# Patient Record
Sex: FEMALE | Race: WHITE | NOT HISPANIC OR LATINO | Employment: FULL TIME | URBAN - METROPOLITAN AREA
[De-identification: names, ages, dates, MRNs, and addresses within clinical notes are randomized per-mention and may not be internally consistent; named-entity substitution may affect disease eponyms.]

---

## 2018-05-04 ENCOUNTER — TELEPHONE (OUTPATIENT)
Dept: PEDIATRICS | Facility: CLINIC | Age: 53
End: 2018-05-04

## 2019-12-11 ENCOUNTER — NURSE TRIAGE (OUTPATIENT)
Dept: NURSING | Facility: CLINIC | Age: 54
End: 2019-12-11

## 2019-12-11 NOTE — TELEPHONE ENCOUNTER
"Patient says she fell on the ice at about 2:30pm when got home work.  Patient reports she hit head on ice by her car.  Patient has a headache; pain level is 4-5/10.  Reviewed care advice with caller per RN triage protocol.  FNA advised to call back with any concerns.   Caller verbalized understanding.      Additional Information    Negative: [1] ACUTE NEURO SYMPTOM AND [2] present now  (DEFINITION: difficult to awaken OR confused thinking and talking OR slurred speech OR weakness of arms OR unsteady walking)    Negative: Knocked out (unconscious) > 1 minute    Negative: Seizure (convulsion) occurred  (Exception: prior history of seizures and now alert and without Acute Neuro Symptoms)    Negative: Penetrating head injury (e.g., knife, gun shot wound, metal object)    Negative: [1] Major bleeding (e.g., actively dripping or spurting) AND [2] can't be stopped    Negative: [1] Dangerous mechanism of injury (e.g., MVA, diving, trampoline, contact sports, fall > 10 feet or 3 meters) AND [2] NECK pain AND [3] began < 1 hour after injury    Negative: Sounds like a life-threatening emergency to the triager    Negative: [1] Recently examined and diagnosed with a concussion by a healthcare provider AND [2] questions about concussion symptoms    Negative: Can't remember what happened (amnesia)    Negative: Vomiting once or more    Negative: [1] Loss of vision or double vision AND [2] present now    Negative: Watery or blood-tinged fluid dripping from the NOSE or EARS now  (Exception: tears from crying or nosebleed from nasal trauma)    Negative: One or two \"black eyes\" (bruising, purple color of eyelids)    Negative: Large swelling or bruise > 2 inches (5 cm)    Negative: Skin is split open or gaping  (or length > 1/2 inch or 12 mm)    Negative: [1] Bleeding AND [2] won't stop after 10 minutes of direct pressure (using correct technique)    Negative: Sounds like a serious injury to the triager    Negative: [1] ACUTE NEURO " SYMPTOM AND [2] now fine  (DEFINITION: difficult to awaken OR confused thinking and talking OR slurred speech OR weakness of arms OR unsteady walking)    Negative: [1] Knocked out (unconscious) < 1 minute AND [2] now fine    Negative: [1] SEVERE headache AND [2] not improved 2 hours after pain medicine/ice packs    Negative: Dangerous injury (e.g., MVA, diving, trampoline, contact sports, fall > 10 feet or 3 meters) or severe blow from hard object (e.g., golf club or baseball bat)    Negative: Taking Coumadin (warfarin) or other strong blood thinner, or known bleeding disorder (e.g., thrombocytopenia)    Negative: Suspicious history for the injury    Negative: [1] Age over 65 years AND [2] swelling or bruise    Negative: Patient is confused or is an unreliable provider of information (e.g., dementia, profound mental retardation, alcohol intoxication)    Negative: [1] Last tetanus shot > 5 years ago AND [2] DIRTY cut or scrape    Negative: [1] After 72 hours AND [2] headache persists    Scalp swelling, bruise or pain    Protocols used: HEAD INJURY-A-

## 2021-01-04 ENCOUNTER — NURSE TRIAGE (OUTPATIENT)
Dept: NURSING | Facility: CLINIC | Age: 56
End: 2021-01-04

## 2021-01-04 NOTE — TELEPHONE ENCOUNTER
Triage Call: Pt gave spouse, Danilo consent to communicate.   Pt was seen in an the ER last night and was prescribed Prednisone 20mg bid for a rotator cuff issues. Spouse states that they have been trying to reach pts pcp all day because they stated she has a hx of pancreatitis and seen that the medication lists it as a side effect. Pts PCP has not returned there call. Pts PCP is an [facility] provider. Pt refused to take the medication today. Pt was advised to call her pharmacy, and to be seen in an urgent care if there pcp does not address the pts concerns.     COVID 19 Nurse Triage Plan/Patient Instructions    Please be aware that novel coronavirus (COVID-19) may be circulating in the community. If you develop symptoms such as fever, cough, or SOB or if you have concerns about the presence of another infection including coronavirus (COVID-19), please contact your health care provider or visit [email or web address].     Disposition/Instructions    In-Person Visit with provider recommended. Reference Visit Selection Guide.    Thank you for taking steps to prevent the spread of this virus.  o Limit your contact with others.  o Wear a simple mask to cover your cough.  o Wash your hands well and often.    Resources    [facility]: About COVID-19: [email or web address]    CDC: What to Do If You're Sick: www.cdc.gov/coronavirus/2019-ncov/about/steps-when-sick.html    CDC: Ending Home Isolation: www.cdc.gov/coronavirus/2019-ncov/hcp/disposition-in-home-patients.html     CDC: Caring for Someone: www.cdc.gov/coronavirus/2019-ncov/if-you-are-sick/care-for-someone.html     [facility]: Interim Guidance for Hospital Discharge to Home: www.health.[facility].[place].us/diseases/coronavirus/hcp/hospdischarge.pdf    [facility] clinical trials (COVID-19 research studies): clinicalaffairs.[facility]/umn-clinical-trials     Below are the COVID-19 hotlines at the [place] Department of Health ([facility]). Interpreters are available.   o For  "health questions: Call 498-696-3807 or 1-560.379.4715 (7 a.m. to 7 p.m.)  o For questions about schools and childcare: Call 676-730-0996 or 1-347.884.8700 (7 a.m. to 7 p.m.)       Jen Guevara RN Nursing Advisor 1/4/2021 4:58 PM     Reason for Disposition    Caller has urgent medication question about med that PCP prescribed and triager unable to answer question    Additional Information    Negative: Drug overdose and nurse unable to answer question    Negative: Caller requesting information not related to medicine    Negative: Caller requesting a prescription for Strep throat and has a positive culture result    Negative: Rash while taking a medication or within 3 days of stopping it    Negative: Immunization reaction suspected    Negative: [1] Asthma AND [2] having symptoms of asthma (cough, wheezing, etc)    Negative: MORE THAN A DOUBLE DOSE of a prescription or over-the-counter (OTC) drug    Negative: [1] DOUBLE DOSE (an extra dose or lesser amount) of over-the-counter (OTC) drug AND [2] any symptoms (e.g., dizziness, nausea, pain, sleepiness)    Negative: [1] DOUBLE DOSE (an extra dose or lesser amount) of prescription drug AND [2] any symptoms (e.g., dizziness, nausea, pain, sleepiness)    Negative: Took another person's prescription drug    Negative: Pharmacy calling with prescription questions and triager unable to answer question    Negative: [1] Prescription not at pharmacy AND [2] was prescribed today by PCP    Negative: [1] Request for URGENT new prescription or refill of \"essential\" medication (i.e., likelihood of harm to patient if not taken) AND [2] triager unable to fill per unit policy    Negative: Diabetes drug error or overdose (e.g., insulin or extra dose)    Negative: [1] DOUBLE DOSE (an extra dose or lesser amount) of prescription drug AND [2] NO symptoms (Exception: a double dose of antibiotics)    Protocols used: MEDICATION QUESTION CALL-A-      "

## 2022-06-20 NOTE — PROGRESS NOTES
CHIEF COMPLAINT:  No chief complaint on file.       HISTORY OF PRESENT ILLNESS  Ms. Grijalva is a pleasant 56 year old year old female who presents to clinic today with Left knee pain.  Patricia explains that    Onset: gradual  Location: left knee  Quality:  Aching, popping  Duration: 6-8 months   Severity: 9/10 at worst  Timing:constant  Modifying factors:  resting and non-use makes it better, movement and use makes it worse  Associated signs & symptoms: none  Previous similar pain: No  Treatments to date:ice, aleve, tylenol PM, knee sleeve    Additional history: as documented    Review of Systems: A 10-point review of systems was obtained and is negative except for as noted in the HPI.       MEDICAL HISTORY  Patient Active Problem List   Diagnosis     [facility] SPRAIN OF NECK     [facility] OTHER BACK SYMPTOMS       Current Outpatient Medications   Medication Sig Dispense Refill     ibuprofen (ADVIL,MOTRIN) 600 MG tablet Take 1 tablet by mouth every 6 hours as needed for pain. 30 tablet 1       Allergies   Allergen Reactions     Latex      Penicillins      Percocet [Oxycodone-Acetaminophen]        No family history on file.    Additional medical/Social/Surgical histories reviewed in [facility] and updated as appropriate.       PHYSICAL EXAM  There were no vitals taken for this visit.    General  - normal appearance, in no obvious distress  HEENT  - conjunctivae not injected, moist mucous membranes  CV  - normal popliteal pulse  Pulm  - normal respiratory pattern, non-labored  Musculoskeletal -left knee  - stance: Mildly antalgic gait  - inspection: no swelling or effusion, normal bone and joint alignment, no obvious deformity  - palpation: Medial joint line tenderness, patella and patellar tendon non-tender  - ROM: 125 degrees flexion, -5 degrees extension, not painful, normal actively and passively compared to contralateral  - strength: 5/5 in flexion, 5/5 in extension  - special tests:  (-) Lachman with mild laxity bilaterally,  but firm end feel bilaterally  (-) anterior drawer  (-) Ceci  (-) varus at 0 and 30 degrees flexion  (-) valgus at 0 and 30 degrees flexion  Neuro  - no sensory or motor deficit, grossly normal coordination, normal muscle tone  Skin  - no ecchymosis, erythema, warmth, or induration, no obvious rash  Psych  - interactive, appropriate, normal mood and affect    IMAGING :  For Patients: As a result of the 21st Century Cures Act, medical imaging exams and procedure reports are released immediately into your electronic medical record. You may view this report before your referring provider. If you have questions, please contact your health care provider.     EXAM: XR KNEE 2 VIEWS LEFT   LOCATION: [facility]   DATE/TIME: 3/28/2022 6:38 PM     INDICATION: KNEE PAIN/PROBLEM   COMPARISON: None.     IMPRESSION: Normal joint spaces and alignment. No fracture or joint effusion.       ASSESSMENT & PLAN  Ms. Grijalva is a 56 year old year old female who presents to clinic today with chronic medial sided left knee pain.    XR at [facility] unremarkable per report.  ED note reviewed 3/26/22.    Initially diagnosed with MCL sprain on March 26.  Otherwise, not improving greatly.  Suspected meniscus tear, medial of left knee.    Discussed further diagnostics including MRI, treatments such as PT, injections of corticosteroid, and other adjuncts.    Diagnosis:   Chronic pain of left knee    -Wishes to start PT, had a great encounter with PT in the past and worked well for her  -Consider injection/MRI if no improvement in 6 weeks  -Continue with ice, aleve, and knee sleeve with activity  -Follow up in 6 weeks.    It was a pleasure seeing Patricia today.    Darrian Anrde DO, CAM  Primary Care Sports Medicine

## 2022-06-22 ENCOUNTER — OFFICE VISIT (OUTPATIENT)
Dept: ORTHOPEDICS | Facility: CLINIC | Age: 57
End: 2022-06-22
Payer: COMMERCIAL

## 2022-06-22 VITALS — WEIGHT: 183 LBS | DIASTOLIC BLOOD PRESSURE: 94 MMHG | SYSTOLIC BLOOD PRESSURE: 142 MMHG

## 2022-06-22 DIAGNOSIS — M25.562 CHRONIC PAIN OF LEFT KNEE: Primary | ICD-10-CM

## 2022-06-22 DIAGNOSIS — G89.29 CHRONIC PAIN OF LEFT KNEE: Primary | ICD-10-CM

## 2022-06-22 PROCEDURE — 99203 OFFICE O/P NEW LOW 30 MIN: CPT | Performed by: FAMILY MEDICINE

## 2022-06-22 RX ORDER — LOSARTAN POTASSIUM 50 MG/1
50 TABLET ORAL DAILY
COMMUNITY

## 2022-06-22 NOTE — LETTER
6/22/2022         RE: Patricia Grijalva  [address] [address] [id number]        Dear Colleague,    Thank you for referring your patient, Patricia Grjialva, to the [facility] [place]. Please see a copy of my visit note below.    CHIEF COMPLAINT:  No chief complaint on file.       HISTORY OF PRESENT ILLNESS  Ms. Grijalva is a pleasant 56 year old year old female who presents to clinic today with Left knee pain.  Patricia explains that    Onset: gradual  Location: left knee  Quality:  Aching, popping  Duration: 6-8 months   Severity: 9/10 at worst  Timing:constant  Modifying factors:  resting and non-use makes it better, movement and use makes it worse  Associated signs & symptoms: none  Previous similar pain: No  Treatments to date:ice, aleve, tylenol PM, knee sleeve    Additional history: as documented    Review of Systems: A 10-point review of systems was obtained and is negative except for as noted in the HPI.       MEDICAL HISTORY  Patient Active Problem List   Diagnosis     [facility] SPRAIN OF NECK     [facility] OTHER BACK SYMPTOMS       Current Outpatient Medications   Medication Sig Dispense Refill     ibuprofen (ADVIL,MOTRIN) 600 MG tablet Take 1 tablet by mouth every 6 hours as needed for pain. 30 tablet 1       Allergies   Allergen Reactions     Latex      Penicillins      Percocet [Oxycodone-Acetaminophen]        No family history on file.    Additional medical/Social/Surgical histories reviewed in [facility] and updated as appropriate.       PHYSICAL EXAM  There were no vitals taken for this visit.    General  - normal appearance, in no obvious distress  HEENT  - conjunctivae not injected, moist mucous membranes  CV  - normal popliteal pulse  Pulm  - normal respiratory pattern, non-labored  Musculoskeletal -left knee  - stance: Mildly antalgic gait  - inspection: no swelling or effusion, normal bone and joint alignment, no obvious deformity  - palpation: Medial joint line tenderness,  patella and patellar tendon non-tender  - ROM: 125 degrees flexion, -5 degrees extension, not painful, normal actively and passively compared to contralateral  - strength: 5/5 in flexion, 5/5 in extension  - special tests:  (-) Lachman with mild laxity bilaterally, but firm end feel bilaterally  (-) anterior drawer  (-) Ceci  (-) varus at 0 and 30 degrees flexion  (-) valgus at 0 and 30 degrees flexion  Neuro  - no sensory or motor deficit, grossly normal coordination, normal muscle tone  Skin  - no ecchymosis, erythema, warmth, or induration, no obvious rash  Psych  - interactive, appropriate, normal mood and affect    IMAGING :  For Patients: As a result of the 21st Century Cures Act, medical imaging exams and procedure reports are released immediately into your electronic medical record. You may view this report before your referring provider. If you have questions, please contact your health care provider.     EXAM: XR KNEE 2 VIEWS LEFT   LOCATION: [facility] [place]   DATE/TIME: 3/28/2022 6:38 PM     INDICATION: KNEE PAIN/PROBLEM   COMPARISON: None.     IMPRESSION: Normal joint spaces and alignment. No fracture or joint effusion.       ASSESSMENT & PLAN  Ms. Grijalva is a 56 year old year old female who presents to clinic today with chronic medial sided left knee pain.    XR at [facility] unremarkable per report.  ED note reviewed 3/26/22.    Initially diagnosed with MCL sprain on March 26.  Otherwise, not improving greatly.  Suspected meniscus tear, medial of left knee.    Discussed further diagnostics including MRI, treatments such as PT, injections of corticosteroid, and other adjuncts.    Diagnosis:   Chronic pain of left knee    -Wishes to start PT, had a great encounter with PT in the past and worked well for her  -Consider injection/MRI if no improvement in 6 weeks  -Continue with ice, aleve, and knee sleeve with activity  -Follow up in 6 weeks.    It was a pleasure seeing Patricia today.    Darrian  DO Thai, [facility]  Primary Care Sports Medicine      Again, thank you for allowing me to participate in the care of your patient.        Sincerely,        Darrian Andre DO

## 2022-10-24 ENCOUNTER — VIRTUAL VISIT (OUTPATIENT)
Dept: FAMILY MEDICINE | Facility: CLINIC | Age: 57
End: 2022-10-24
Payer: COMMERCIAL

## 2022-10-24 ENCOUNTER — NURSE TRIAGE (OUTPATIENT)
Dept: NURSING | Facility: CLINIC | Age: 57
End: 2022-10-24

## 2022-10-24 VITALS — WEIGHT: 180 LBS

## 2022-10-24 DIAGNOSIS — J20.9 ACUTE BRONCHITIS, UNSPECIFIED ORGANISM: Primary | ICD-10-CM

## 2022-10-24 PROCEDURE — 99213 OFFICE O/P EST LOW 20 MIN: CPT | Mod: CS | Performed by: NURSE PRACTITIONER

## 2022-10-24 RX ORDER — PREDNISONE 20 MG/1
40 TABLET ORAL DAILY
Qty: 14 TABLET | Refills: 0 | Status: SHIPPED | OUTPATIENT
Start: 2022-10-24 | End: 2022-10-31

## 2022-10-24 RX ORDER — BENZONATATE 200 MG/1
200 CAPSULE ORAL 3 TIMES DAILY PRN
Qty: 30 CAPSULE | Refills: 0 | Status: SHIPPED | OUTPATIENT
Start: 2022-10-24

## 2022-10-24 ASSESSMENT — ENCOUNTER SYMPTOMS
COUGH: 1
FATIGUE: 1
SHORTNESS OF BREATH: 1

## 2022-10-24 NOTE — TELEPHONE ENCOUNTER
S-(situation): post COVID symptoms    B-(background):    Danilo hampton, pt gave verbal consent    The entire family tested positive for COVID on 10/15.  Pt and  was given Paxlovid at the [facility] which they have completed.  and 2 kids are better.  Pt has lingering symptoms    Pt also has anxiety    Symptoms started with bad headaches, fever, nausea, weakness, cough. All symptoms have improved except for cough.    A-(assessment):   Deep nagging cough which is worse than when she first had COVID  Constant chest tightness, described as mild     did report that she was better than before, except that her symptoms have not cleared up.    Pt still able to do chores, currently cooking  No SOB  No fever    R-(recommendations):   Virtual visit today.  Transferred to .    Ingrid Gresham RN/[place] Nurse Advisor          Reason for Disposition    [1] PERSISTING SYMPTOMS OF COVID-19 AND [2] symptoms WORSE    Additional Information    Negative: SEVERE difficulty breathing (e.g., struggling for each breath, speaks in single words)    Negative: [1] SEVERE weakness (e.g., can't stand or can barely walk) AND [2] new-onset or WORSE    Negative: Difficult to awaken or acting confused (e.g., disoriented, slurred speech)    Negative: Bluish (or gray) lips or face now    Negative: Sounds like a life-threatening emergency to the triager    Negative: [1] Typical COVID-19 symptoms AND [2] lasting less than 3 weeks    Negative: [1] Chest pain, pressure, or tightness AND [2] new-onset or worsening    Negative: [1] Fever AND [2] new-onset or worsening    Negative: [1] MODERATE difficulty breathing (e.g., speaks in phrases, SOB even at rest, pulse 100-120) AND [2] new-onset or WORSE    Negative: [1] MODERATE difficulty breathing AND [2] oxygen level (e.g., pulse oximetry) 91 to 94 percent    Negative: Oxygen level (e.g., pulse oximetry) 90 percent or lower    Negative: MODERATE difficulty  breathing (e.g., speaks in phrases, SOB even at rest, pulse 100-120)    Negative: Patient sounds very sick or weak to the triager    Negative: [1] Drinking very little AND [2] dehydration suspected (e.g., no urine > 12 hours, very dry mouth, very lightheaded)    Negative: [1] MILD difficulty breathing (e.g., minimal/no SOB at rest, SOB with walking, pulse <100) AND [2] new-onset    Negative: Oxygen level (e.g., pulse oximetry) 91 to 94 percent    Negative: [1] PERSISTING SYMPTOMS OF COVID-19 AND [2] NEW symptom AND [3] could be serious    Negative: [1] Caller has URGENT question AND [2] triager unable to answer question    Protocols used: CORONAVIRUS (COVID-19) PERSISTING SYMPTOMS FOLLOW-UP CALL-A-[place] 1.18.2022

## 2022-10-24 NOTE — PROGRESS NOTES
Edwin is a 57 year old who is being evaluated via a billable video visit.      How would you like to obtain your AVS? Mail a copy  If the video visit is dropped, the invitation should be resent by: Text to cell phone: [phone]  Will anyone else be joining your video visit? No        Assessment & Plan     Acute bronchitis, unspecified organism  From COVID Infection, Paxlovid completed, continues to have bronchial cough with mucus production.  - predniSONE (DELTASONE) 20 MG tablet; Take 2 tablets (40 mg) by mouth daily for 7 days  - benzonatate (TESSALON) 200 MG capsule; Take 1 capsule (200 mg) by mouth 3 times daily as needed      Return in about 1 week (around 10/31/2022), or if symptoms worsen or fail to improve, for Follow up.    NEIL Hou CNP [facility] NAOMI Gomez is a 57 year old, presenting for the following health issues:  Covid Concern  cough    HPI       COVID-19 Symptom Review  How many days ago did these symptoms start? 10/15/22 - tested positive 10/17/22    Are any of the following symptoms significant for you?  New or worsening difficulty breathing? Yes    Please describe what kind of difficulty you are having breathing:Mild dyspnea (able to do ADLs without difficulty, mild shortness of breath with activities such as climbing one or two flights of stairs or walking briskly)    Worsening cough? Yes, I am coughing up mucus.    Fever or chills? Yes, the highest temperature was 102.8 - not anylonger    Headache: YES    Sore throat: YES    Chest pain: No    Diarrhea: No    Body aches? YES    What treatments has patient tried? Guaifenesin (mucinex), Decongestant - oral and paxlovid   Does patient live in a nursing home, group home, or shelter? No  Does patient have a way to get food/medications during quarantined? Yes, I have a friend or family member who can help me.    Continues to have symptoms of cough, mucus production down throat, nasal  congestion. Symptoms of COVID started on Oct 15; tested positive for COVID on Oct 17th. Started Paxlovid on Oct 17th,     Review of Systems   Constitutional: Positive for fatigue.   HENT: Positive for congestion and postnasal drip.    Respiratory: Positive for cough and shortness of breath.             Objective           Vitals:  No vitals were obtained today due to virtual visit.    Physical Exam   GENERAL: Healthy, alert and no distress  EYES: Eyes grossly normal to inspection.  No discharge or erythema, or obvious scleral/conjunctival abnormalities.  RESP: No audible wheeze, cough, or visible cyanosis.  No visible retractions or increased work of breathing.    SKIN: Visible skin clear. No significant rash, abnormal pigmentation or lesions.  NEURO: Cranial nerves grossly intact.  Mentation and speech appropriate for age.  PSYCH: Mentation appears normal, affect normal/bright, judgement and insight intact, normal speech and appearance well-groomed.            Video-Visit Details    Video Start Time: 5:47 PM    Type of service:  Video Visit    Video End Time:5:56 PM    Originating Location (pt. Location): Home        Distant Location (provider location):  On-site    Platform used for Video Visit: Andrew

## 2022-12-05 ENCOUNTER — THERAPY VISIT (OUTPATIENT)
Dept: PHYSICAL THERAPY | Facility: CLINIC | Age: 57
End: 2022-12-05
Attending: FAMILY MEDICINE
Payer: COMMERCIAL

## 2022-12-05 DIAGNOSIS — M25.562 ACUTE PAIN OF LEFT KNEE: ICD-10-CM

## 2022-12-05 DIAGNOSIS — M25.562 CHRONIC PAIN OF LEFT KNEE: ICD-10-CM

## 2022-12-05 DIAGNOSIS — G89.29 CHRONIC PAIN OF LEFT KNEE: ICD-10-CM

## 2022-12-05 PROCEDURE — 97110 THERAPEUTIC EXERCISES: CPT | Mod: GP | Performed by: PHYSICAL THERAPIST

## 2022-12-05 PROCEDURE — 97010 HOT OR COLD PACKS THERAPY: CPT | Mod: GP | Performed by: PHYSICAL THERAPIST

## 2022-12-05 PROCEDURE — 97161 PT EVAL LOW COMPLEX 20 MIN: CPT | Mod: GP | Performed by: PHYSICAL THERAPIST

## 2022-12-05 ASSESSMENT — ACTIVITIES OF DAILY LIVING (ADL)
GO UP STAIRS: ACTIVITY IS MINIMALLY DIFFICULT
HOW_WOULD_YOU_RATE_THE_CURRENT_FUNCTION_OF_YOUR_KNEE_DURING_YOUR_USUAL_DAILY_ACTIVITIES_ON_A_SCALE_FROM_0_TO_100_WITH_100_BEING_YOUR_LEVEL_OF_KNEE_FUNCTION_PRIOR_TO_YOUR_INJURY_AND_0_BEING_THE_INABILITY_TO_PERFORM_ANY_OF_YOUR_USUAL_DAILY_ACTIVITIES?: 90
AS_A_RESULT_OF_YOUR_KNEE_INJURY,_HOW_WOULD_YOU_RATE_YOUR_CURRENT_LEVEL_OF_DAILY_ACTIVITY?: NEARLY NORMAL
KNEE_ACTIVITY_OF_DAILY_LIVING_SCORE: 88.57
GIVING WAY, BUCKLING OR SHIFTING OF KNEE: I HAVE THE SYMPTOM BUT IT DOES NOT AFFECT MY ACTIVITY
STAND: ACTIVITY IS NOT DIFFICULT
KNEE_ACTIVITY_OF_DAILY_LIVING_SUM: 62
SQUAT: ACTIVITY IS NOT DIFFICULT
STIFFNESS: I HAVE THE SYMPTOM BUT IT DOES NOT AFFECT MY ACTIVITY
HOW_WOULD_YOU_RATE_THE_OVERALL_FUNCTION_OF_YOUR_KNEE_DURING_YOUR_USUAL_DAILY_ACTIVITIES?: NEARLY NORMAL
RISE FROM A CHAIR: ACTIVITY IS NOT DIFFICULT
KNEEL ON THE FRONT OF YOUR KNEE: ACTIVITY IS MINIMALLY DIFFICULT
SWELLING: I HAVE THE SYMPTOM BUT IT DOES NOT AFFECT MY ACTIVITY
WALK: ACTIVITY IS NOT DIFFICULT
GO DOWN STAIRS: ACTIVITY IS MINIMALLY DIFFICULT
PAIN: I HAVE THE SYMPTOM BUT IT DOES NOT AFFECT MY ACTIVITY
WEAKNESS: I HAVE THE SYMPTOM BUT IT DOES NOT AFFECT MY ACTIVITY
RAW_SCORE: 62
LIMPING: I DO NOT HAVE THE SYMPTOM
SIT WITH YOUR KNEE BENT: ACTIVITY IS NOT DIFFICULT

## 2022-12-05 NOTE — PROGRESS NOTES
Physical Therapy Initial Evaluation  Subjective:  Pt reports an insidious onset of left knee pain and swelling about 6 months ago.  She has continued to have intermittent pain, swelling, popping, and weakness of the left knee.  No prior injuries.  Did fall and land on her knee 2 years ago, but had not had knee pain since that time.  Locates the pain mainly in the medial knee, but can be felt laterally.  Most likely to have pain with stairs, kneeling on the knee, and squatting.  Recent x-rays were taken and she believes she was told she has arthritis.      The history is provided by the patient.   Therapist Generated HPI Evaluation         Type of problem:  Left knee.    This is a new condition.  Condition occurred with:  Insidious onset.  Where condition occurred: for unknown reasons.  Patient reports pain:  Anterior, medial and lateral.  Pain is described as aching and sharp and is intermittent.  Pain is the same all the time.  Since onset symptoms are unchanged.  Associated symptoms:  Buckling/giving out, edema and loss of strength. Symptoms are exacerbated by bending/squatting, kneeling, descending stairs, ascending stairs and transfers  and relieved by nothing.  Special tests included:  X-ray.    Restrictions due to condition include:  Working in normal job without restrictions.  Barriers include:  None as reported by patient.                        Objective:  Standing Alignment:              Knee:  Normal      Gait:    Gait Type:  Normal                                                           Knee Evaluation:  ROM:  AROM: normal  PROM: normal  Strength:  Normal            Ligament Testing:  Normal                Special Tests:     Left knee negative for the following special tests:  Meninscal and Patellar compression    Palpation:    Left knee tenderness present at:  Medial Joint Line  Left knee tenderness not present at:  Lateral Joint Line; Patellar Tendon; Patellar Medial and Patellar Lateral    Edema:   Edema of the knee: Left knee moderately swollen.    Mobility Testing:  Normal            Functional Testing:          Quad:    Single Leg Squat:  Left:       Right:        Bilateral Leg Squat:  Mild pain  Normal control              General     ROS    Assessment/Plan:    Patient is a 57 year old female with left side knee complaints.    Patient has the following significant findings with corresponding treatment plan.                Diagnosis 1:  Suspected left knee OA  Pain -  hot/cold therapy, education and home program  Edema - cold therapy and self management/home program    Therapy Evaluation Codes:   1) History comprised of:   Personal factors that impact the plan of care:      None.    Comorbidity factors that impact the plan of care are:      None.     Medications impacting care: None.  2) Examination of Body Systems comprised of:   Body structures and functions that impact the plan of care:      Knee.   Activity limitations that impact the plan of care are:      Squatting/kneeling, Stairs and Walking.  3) Clinical presentation characteristics are:   Stable/Uncomplicated.  4) Decision-Making    Low complexity using standardized patient assessment instrument and/or measureable assessment of functional outcome.  Cumulative Therapy Evaluation is: Low complexity.    Previous and current functional limitations:  (See Goal Flow Sheet for this information)    Short term and Long term goals: (See Goal Flow Sheet for this information)     Communication ability:  Patient appears to be able to clearly communicate and understand verbal and written communication and follow directions correctly.  Treatment Explanation - The following has been discussed with the patient:   RX ordered/plan of care  Anticipated outcomes  Possible risks and side effects  This patient would benefit from PT intervention to resume normal activities.   Rehab potential is good.    Frequency:  Every other week, once daily  Duration:  for 6  weeks  Discharge Plan:  Achieve all LTG.  Independent in home treatment program.  Reach maximal therapeutic benefit.    Please refer to the daily flowsheet for treatment today, total treatment time and time spent performing 1:1 timed codes.

## 2022-12-27 ENCOUNTER — THERAPY VISIT (OUTPATIENT)
Dept: PHYSICAL THERAPY | Facility: CLINIC | Age: 57
End: 2022-12-27
Payer: COMMERCIAL

## 2022-12-27 DIAGNOSIS — M25.562 ACUTE PAIN OF LEFT KNEE: Primary | ICD-10-CM

## 2022-12-27 PROCEDURE — 97110 THERAPEUTIC EXERCISES: CPT | Mod: GP | Performed by: PHYSICAL THERAPIST

## 2022-12-27 PROCEDURE — 97010 HOT OR COLD PACKS THERAPY: CPT | Mod: GP | Performed by: PHYSICAL THERAPIST

## 2023-02-08 NOTE — PROGRESS NOTES
Discharge Note        Patricia failed to follow up and current status is unknown.  Please see information below for last relevant information on current status.  Patient seen for 2 visits.    SUBJECTIVE  Subjective changes noted by patient:  The knee has been doing well.  Not as painful and popping less.  Not typically having knee pain with stairs now.  If she does have pain it is rated as up to a 3/10.  .  Current pain level is  .     Previous pain level was   .   Changes in function:  Yes (See Goal flowsheet attached for changes in current functional level)  Adverse reaction to treatment or activity: None    OBJECTIVE  Changes noted in objective findings: Full ROM.  No pain with patellar glides.  No pain with DL squats, but notes popping in left knee.     ASSESSMENT/PLAN  Diagnosis: Left knee OA   Updated problem list and treatment plan:   Pain - HEP  Decreased strength - HEP  STG/LTGs have been met or progress has been made towards goals:  Yes, please see goal flowsheet for most current information  Assessment of Progress: current status is unknown.    Last current status: Pt is progressing well   Self Management Plans:  HEP  I have re-evaluated this patient and find that the nature, scope, duration and intensity of the therapy is appropriate for the medical condition of the patient.  Patricia continues to require the following intervention to meet STG and LTG's:  HEP.    Recommendations:  Discharge with current home program.  Patient to follow up with MD as needed.    Please refer to the daily flowsheet for treatment today, total treatment time and time spent performing 1:1 timed codes.

## 2023-03-26 ENCOUNTER — HEALTH MAINTENANCE LETTER (OUTPATIENT)
Age: 58
End: 2023-03-26

## 2024-05-26 ENCOUNTER — HEALTH MAINTENANCE LETTER (OUTPATIENT)
Age: 59
End: 2024-05-26

## 2025-04-12 ENCOUNTER — HEALTH MAINTENANCE LETTER (OUTPATIENT)
Age: 60
End: 2025-04-12

## 2025-06-14 ENCOUNTER — HEALTH MAINTENANCE LETTER (OUTPATIENT)
Age: 60
End: 2025-06-14